# Patient Record
Sex: FEMALE | URBAN - METROPOLITAN AREA
[De-identification: names, ages, dates, MRNs, and addresses within clinical notes are randomized per-mention and may not be internally consistent; named-entity substitution may affect disease eponyms.]

---

## 2022-06-01 ENCOUNTER — APPOINTMENT (OUTPATIENT)
Dept: URBAN - METROPOLITAN AREA CLINIC 263 | Age: 57
Setting detail: DERMATOLOGY
End: 2022-06-01

## 2022-06-01 DIAGNOSIS — L93.2 OTHER LOCAL LUPUS ERYTHEMATOSUS: ICD-10-CM

## 2022-06-01 DIAGNOSIS — L71.8 OTHER ROSACEA: ICD-10-CM

## 2022-06-01 PROCEDURE — OTHER PRESCRIPTION MEDICATION MANAGEMENT: OTHER

## 2022-06-01 PROCEDURE — 99203 OFFICE O/P NEW LOW 30 MIN: CPT

## 2022-06-01 PROCEDURE — OTHER MIPS QUALITY: OTHER

## 2022-06-01 PROCEDURE — OTHER DIAGNOSIS COMMENT: OTHER

## 2022-06-01 PROCEDURE — OTHER COUNSELING: OTHER

## 2022-06-01 PROCEDURE — OTHER TREATMENT REGIMEN: OTHER

## 2022-06-01 PROCEDURE — OTHER PRESCRIPTION: OTHER

## 2022-06-01 RX ORDER — PIMECROLIMUS 10 MG/G
CREAM TOPICAL BID
Qty: 100 | Refills: 3 | Status: ERX | COMMUNITY
Start: 2022-06-01

## 2022-06-01 NOTE — PROCEDURE: PRESCRIPTION MEDICATION MANAGEMENT
Render In Strict Bullet Format?: No
Detail Level: Zone
Initiate Treatment: pimecrolimus 1 % topical cream AAA bid as needed

## 2022-06-01 NOTE — PROCEDURE: TREATMENT REGIMEN
Plan: laser treatment to help with background redness
Detail Level: Zone
Otc Regimen: Zinc-based sunscreen/makeup

## 2022-06-01 NOTE — PROCEDURE: DIAGNOSIS COMMENT
Comment: photos demonstrate thickened indurated erythematous plaques on cheeks, which come and go typically worsening over course of day. No clear triggers. She has sunscreen in her makeup (zinc based spf 40) but doesn't always wear it if she is not planning on being out. Does not like being in sun anymore, as her skin feels very uncomfortable in the sun. She does not have a diagnosis of lupus but her RADHA is intermittently positive, she has arthritis/arthralgias, is on plaquenil and sq mtx, and has celiac disease.
Detail Level: Simple
Render Risk Assessment In Note?: no
Comment: Hx of testing positive for RADHA, arthritis, and describes photosensitivity